# Patient Record
Sex: MALE | Race: WHITE | NOT HISPANIC OR LATINO | URBAN - METROPOLITAN AREA
[De-identification: names, ages, dates, MRNs, and addresses within clinical notes are randomized per-mention and may not be internally consistent; named-entity substitution may affect disease eponyms.]

---

## 2020-12-02 ENCOUNTER — EMERGENCY (EMERGENCY)
Facility: HOSPITAL | Age: 40
LOS: 1 days | Discharge: HOME | End: 2020-12-02
Attending: STUDENT IN AN ORGANIZED HEALTH CARE EDUCATION/TRAINING PROGRAM
Payer: COMMERCIAL

## 2020-12-02 VITALS
TEMPERATURE: 98 F | HEIGHT: 71 IN | HEART RATE: 74 BPM | OXYGEN SATURATION: 100 % | RESPIRATION RATE: 18 BRPM | SYSTOLIC BLOOD PRESSURE: 167 MMHG | DIASTOLIC BLOOD PRESSURE: 93 MMHG | WEIGHT: 154.98 LBS

## 2020-12-02 LAB
APPEARANCE UR: ABNORMAL
BACTERIA # UR AUTO: NEGATIVE — SIGNIFICANT CHANGE UP
BILIRUB UR-MCNC: NEGATIVE — SIGNIFICANT CHANGE UP
COLOR SPEC: YELLOW — SIGNIFICANT CHANGE UP
DIFF PNL FLD: NEGATIVE — SIGNIFICANT CHANGE UP
EPI CELLS # UR: 0 /HPF — SIGNIFICANT CHANGE UP
GLUCOSE UR QL: NEGATIVE — SIGNIFICANT CHANGE UP
KETONES UR-MCNC: NEGATIVE — SIGNIFICANT CHANGE UP
LEUKOCYTE ESTERASE UR-ACNC: NEGATIVE — SIGNIFICANT CHANGE UP
NITRITE UR-MCNC: NEGATIVE — SIGNIFICANT CHANGE UP
PH UR: 7.5 — SIGNIFICANT CHANGE UP (ref 5–8)
PROT UR-MCNC: ABNORMAL
RBC CASTS # UR COMP ASSIST: 4 /HPF — SIGNIFICANT CHANGE UP (ref 0–4)
SP GR SPEC: 1.02 — SIGNIFICANT CHANGE UP (ref 1.01–1.02)
UROBILINOGEN FLD QL: NEGATIVE — SIGNIFICANT CHANGE UP
WBC UR QL: 0 /HPF — SIGNIFICANT CHANGE UP (ref 0–5)

## 2020-12-02 PROCEDURE — 87591 N.GONORRHOEAE DNA AMP PROB: CPT

## 2020-12-02 PROCEDURE — 99284 EMERGENCY DEPT VISIT MOD MDM: CPT | Mod: 25

## 2020-12-02 PROCEDURE — 87086 URINE CULTURE/COLONY COUNT: CPT

## 2020-12-02 PROCEDURE — 93975 VASCULAR STUDY: CPT

## 2020-12-02 PROCEDURE — 76870 US EXAM SCROTUM: CPT

## 2020-12-02 PROCEDURE — 81001 URINALYSIS AUTO W/SCOPE: CPT

## 2020-12-02 PROCEDURE — 99285 EMERGENCY DEPT VISIT HI MDM: CPT

## 2020-12-02 PROCEDURE — 87491 CHLMYD TRACH DNA AMP PROBE: CPT

## 2020-12-02 RX ORDER — IBUPROFEN 200 MG
600 TABLET ORAL ONCE
Refills: 0 | Status: COMPLETED | OUTPATIENT
Start: 2020-12-02 | End: 2020-12-02

## 2020-12-02 RX ORDER — ACETAMINOPHEN 500 MG
650 TABLET ORAL ONCE
Refills: 0 | Status: COMPLETED | OUTPATIENT
Start: 2020-12-02 | End: 2020-12-02

## 2020-12-02 RX ADMIN — Medication 600 MILLIGRAM(S): at 21:15

## 2020-12-02 RX ADMIN — Medication 650 MILLIGRAM(S): at 21:15

## 2020-12-02 NOTE — ED PROVIDER NOTE - PHYSICAL EXAMINATION
GENERAL: well appearing in no acute distress, non-toxic appearing  HEAD: normocephalic, atraumatic  HENT: airway intact; neck supple  EYES: normal conjunctiva  CARDIAC: regular rate and rhythm, normal S1S2, no appreciable murmurs, 2+ pulses in UE/LE b/l  PULM: normal breath sounds, clear to ascultation bilaterally, no rales, rhonchi, wheezing  GI: abdomen nondistended, soft, nontender, no guarding, rebound tenderness  : no CVA tenderness b/l, no suprapubic tenderness:  exam (chaperoned by Dr. Decker) cremasteric reflex intact b/l; right epididymal tenderness; no hernia noted  NEURO: no focal motor or sensory deficits  MSK: no peripheral edema, no calf tenderness b/l  SKIN: well-perfused, extremities warm, no visible rashes  PSYCH: appropriate mood and affect

## 2020-12-02 NOTE — ED PROVIDER NOTE - ATTENDING CONTRIBUTION TO CARE
Decker DO: I have personally performed a face to face medical and diagnostic evaluation of the patient. I have discussed with and reviewed the resident's note and agree with the History, ROS, Physical Exam and MDM unless otherwise indicated. A brief summary of my personal evaluation and impression can be found below.    40M w/ intermittent severe R testicular pain, no infectious symptoms/ dysuria, at time of exam pt nontoxic appearing, 2/10 discomfort, no significant testicular swelling/erythema, some superior anterior testicular ttp, no appreciable hernias, no penile discharge, abd soft ntnd, extremities warm and well perfused, no sensory deficits.  exam chaperoned by myself and performed by Dr Gonzalez. Plan for testicular ultrasound to eval for epididymitis vs orchitis vs torsion. UA. pain control, reassess.
25-Dec-2017

## 2020-12-02 NOTE — ED PROVIDER NOTE - PATIENT PORTAL LINK FT
You can access the FollowMyHealth Patient Portal offered by Health system by registering at the following website: [email or web address]. By joining [facility]’s FollowMyHealth portal, you will also be able to view your health information using other applications (apps) compatible with our system.

## 2020-12-02 NOTE — ED PROVIDER NOTE - CARE PROVIDER_API CALL
Goldberg, Gary D D  UROLOGY  [address] [place] [address], [address]  [place] [address]  Phone: [phone]  Fax: [phone]  Follow Up Time:

## 2020-12-02 NOTE — ED ADULT NURSE NOTE - OBJECTIVE STATEMENT
Pt A&Ox4, ambulatory with steady gait. Pt presented to ED with c/o testicular pain starting today.   Denies CP, SOB< N/V/D, dizziness, difficulty urinating. Pt medicated as per eMAR, awaiting ultrasound

## 2020-12-02 NOTE — ED PROVIDER NOTE - OBJECTIVE STATEMENT
41yo M coming in for right testicular pain since 2:30pm. Pain has been waxing and waning in intensity; 2/10 currently and 9/10 at its most intense. Patient denies any trauma, testicular swelling, urinary pain/penile discharge or fevers/chills. Patient is sexually active with spouse only and received vesicotomy ~1.5 years ago; no history of STIs.

## 2020-12-02 NOTE — ED PROVIDER NOTE - CLINICAL SUMMARY MEDICAL DECISION MAKING FREE TEXT BOX
39yo M coming in w/ right testicular pain likely secondary to epididymitis vs torsion  Plan: UA + US + pain control

## 2020-12-02 NOTE — ED PROVIDER NOTE - NS ED ROS FT
General: denies fever, chills  HENT: denies nasal congestion, rhinorrhea  Eyes: denies visual changes, blurred vision  CV: denies chest pain, palpitations  Resp: denies difficulty breathing, cough  Abdominal: denies nausea, vomiting, diarrhea, abdominal pain  : right testicular pain  MSK: denies muscle aches, leg swelling  Neuro: denies headaches, numbness, tingling  Skin: denies rashes, bruises

## 2020-12-02 NOTE — ED PROVIDER NOTE - NSFOLLOWUPINSTRUCTIONS_ED_ALL_ED_FT
You were evaluated in the Emergency Department for testicular pain.  You were evaluated and examined by a physician, and you had an ultrasound done that did not sure torsion. Based on your evaluation, there are no signs of emergency conditions requiring admission to the hospital on today's workup.      We recommend that you:  1. Please call the urologist provided to you at discharge.  Bring a copy of your discharge paperwork (including any test results) to your doctor.  2. An antibiotic was sent to your pharmacy; please take the medications twice a day.  3. Tylenol up to 650 mg every 8 hours as needed for pain and/or Motrin up to 600 mg every 6 hours as needed for pain. Take any prescribed medications as instructed.      Please return immediately if your pain continues or worsens and is associated with nausea, vomiting or abdominal pain.

## 2020-12-02 NOTE — ED PROVIDER NOTE - RAPID ASSESSMENT
Patient presenting with atraumatic testicular pain x1 day, no noted trauma, no urinary symptoms, no noted swelling/redness to area, no fevers.  No pain medications to date.    I evaluated this patient via telemedicine with their consent in the waiting room on a limited basis.  The patient will have a complete evaluation in the main Emergency Department and ultimate plan of care, diagnosis and disposition is to be determined by the physician performing the full evaluation.  Scotty Guidry MD

## 2020-12-03 VITALS
TEMPERATURE: 98 F | HEART RATE: 65 BPM | SYSTOLIC BLOOD PRESSURE: 115 MMHG | OXYGEN SATURATION: 100 % | DIASTOLIC BLOOD PRESSURE: 84 MMHG | RESPIRATION RATE: 15 BRPM

## 2020-12-03 LAB
C TRACH RRNA SPEC QL NAA+PROBE: SIGNIFICANT CHANGE UP
CULTURE RESULTS: SIGNIFICANT CHANGE UP
N GONORRHOEA RRNA SPEC QL NAA+PROBE: SIGNIFICANT CHANGE UP
SPECIMEN SOURCE: SIGNIFICANT CHANGE UP
SPECIMEN SOURCE: SIGNIFICANT CHANGE UP

## 2020-12-03 PROCEDURE — 99282 EMERGENCY DEPT VISIT SF MDM: CPT

## 2020-12-03 PROCEDURE — 76870 US EXAM SCROTUM: CPT | Mod: 26

## 2020-12-03 PROCEDURE — 93975 VASCULAR STUDY: CPT | Mod: 26

## 2020-12-03 RX ORDER — AZTREONAM 2 G
1 VIAL (EA) INJECTION
Qty: 28 | Refills: 0
Start: 2020-12-03 | End: 2020-12-16

## 2020-12-03 NOTE — CONSULT NOTE ADULT - SUBJECTIVE AND OBJECTIVE BOX
HPI: 41 yo M with PMHx of vasectomy with Dr. Gary Goldberg 1.5 years ago presents to ED with complaint of bilateral testicular pain (R>L) since 2 PM on . Pt states that he was just sitting when he got a sudden sharp, throbbing 9/10 pain in the testicles that radiated to the suprapubic region. Pain comes and goes and is alternated with a mild, dull pain 2/10. Episodes of intense pain last ~30 seconds and then resolve. Last episode of sharp pain roughly two hours ago. Feels pain controlled now with NSAID and Tylenol. In a monogamous relationship with his wife. No past history of STIs. No past history of torsion. Denies fever/chills, N/V, abd pain, difficulty voiding, dysuria, hematuria, discharge from the penis, scrotal skin changes, or other acute complaint at this time.     PAST MEDICAL & SURGICAL HISTORY:  No pertinent past medical history        MEDICATIONS  (STANDING):    MEDICATIONS  (PRN):      FAMILY HISTORY:      Allergies    No Known Allergies    Intolerances        SOCIAL HISTORY:    REVIEW OF SYSTEMS: Otherwise negative as stated in HPI    Physical Exam  Vital signs  T(C): 36.9 (20 @ 02:45), Max: 36.9 (20 @ 02:45)  HR: 65 (20 @ 02:45)  BP: 115/84 (20 @ 02:45)  SpO2: 100% (20 @ 02:45)    Output    Gen: No Acute Distress  Pulm: No respiratory distress  Abd: soft, nontender, nondistended  : Circumcised penis. No blood or discharge at urethral meatus. Testicles descended bilaterally. Verticle lie. Spermatic cord palpable bilaterally and nontender to palpation. No overlying scrotal skin changes or induration. Epididymis nontender bilaterally. cremasteric reflex intact bilaterally. Physical exam not compatible with torsion      LABS:    Urinalysis Basic - ( 02 Dec 2020 21:59 )    Color: Yellow / Appearance: Slightly Turbid / S.025 / pH: x  Gluc: x / Ketone: Negative  / Bili: Negative / Urobili: Negative   Blood: x / Protein: Trace / Nitrite: Negative   Leuk Esterase: Negative / RBC: 4 /hpf / WBC 0 /HPF   Sq Epi: x / Non Sq Epi: 0 /hpf / Bacteria: Negative    Urine Cx: pending  Chlamydia/GC: pending    RADIOLOGY:  < from: US Doppler Scrotum (20 @ 00:07) >  EXAM:  US SCROTUM AND CONTENTS                          EXAM:  US DPLX SCROTUM                            PROCEDURE DATE:  2020            INTERPRETATION:  CLINICAL INFORMATION: Vasectomy 1.5 years ago. Sudden onset testicular pain, right greater than left.    COMPARISON: None available.    TECHNIQUE: Testicular ultrasound utilizing color and spectral Doppler.    FINDINGS:    RIGHT:  Right testis: 4.6 x 2.6 x 3.0 cm. Normal echogenicity and echotexture with no masses or areas of architectural distortion. Normal arterial and venous blood flow pattern.  Right epididymis: Within normal limits.  Right hydrocele: Moderate.  Right varicocele: None.  A 1.2 cm scrotolith.  Increased vascularity/inflammatory changes of the right spermatic cord.      LEFT:  Left testis: 4.9 x 2.5 x 3.2 cm. Normal echogenicity and echotexture with no masses or areas of architectural distortion. Normal arterial and venous blood flow pattern.  Left epididymis: Within normal limits.  Left hydrocele: Small.  Left varicocele: None.    IMPRESSION:  Increased vascularity/inflammatory changes of the right spermatic cord suggestive of vasitis.    Bilateral hydroceles.    < end of copied text >

## 2020-12-03 NOTE — CONSULT NOTE ADULT - ASSESSMENT
A/P: 41 yo M with PMHx of vasectomy with Dr. Gary Goldberg 1.5 years ago here with c/o bilateral testicular pain (R>L). Physical exam and US not concerning for testicular torsion. Pt currently without pain in ED. Testicular pain likely secondary to vasitis, as seen on US.    Recs:  - NSAIDs for pain control  - Please send with prescription for Bactrim  - Follow up urine culture and GC/chlamydia  - Stable for DC home with close follow up with Dr. Gary Goldberg in the office tomorrow  - If severe testicular pain occurs again, please inform pt to return to ED and repeat ultrasound to insure pt is not having a testicular torsion at that time.     Discussed with attending on call Dr. Mitch Dennis    Delay in entering consult note due to [place] Down[place].

## 2021-09-08 NOTE — ED ADULT NURSE NOTE - CHPI ED NUR AGGRAVATING FX
Chief Complaint   Patient presents with    Complete Physical     Annual       He denies any symptoms , reactions or allergies that would exclude them from being immunized today. Risks and adverse reactions were discussed and the VIS was given to them. All questions were addressed. He was observed for 15 min post injection. There were no reactions observed.     Peg Lopez LPN none